# Patient Record
Sex: MALE | Race: WHITE | NOT HISPANIC OR LATINO | Employment: FULL TIME | URBAN - METROPOLITAN AREA
[De-identification: names, ages, dates, MRNs, and addresses within clinical notes are randomized per-mention and may not be internally consistent; named-entity substitution may affect disease eponyms.]

---

## 2019-11-26 ENCOUNTER — OFFICE VISIT (OUTPATIENT)
Dept: URGENT CARE | Facility: URGENT CARE | Age: 39
End: 2019-11-26
Payer: COMMERCIAL

## 2019-11-26 VITALS
OXYGEN SATURATION: 97 % | TEMPERATURE: 98.8 F | DIASTOLIC BLOOD PRESSURE: 84 MMHG | HEART RATE: 88 BPM | SYSTOLIC BLOOD PRESSURE: 129 MMHG

## 2019-11-26 DIAGNOSIS — R07.0 THROAT PAIN: Primary | ICD-10-CM

## 2019-11-26 LAB
DEPRECATED S PYO AG THROAT QL EIA: NORMAL
SPECIMEN SOURCE: NORMAL

## 2019-11-26 PROCEDURE — 87880 STREP A ASSAY W/OPTIC: CPT | Performed by: PHYSICIAN ASSISTANT

## 2019-11-26 PROCEDURE — 99203 OFFICE O/P NEW LOW 30 MIN: CPT | Performed by: PHYSICIAN ASSISTANT

## 2019-11-26 PROCEDURE — 87081 CULTURE SCREEN ONLY: CPT | Performed by: PHYSICIAN ASSISTANT

## 2019-11-26 RX ORDER — BUPROPION HYDROCHLORIDE 150 MG/1
150 TABLET ORAL
COMMUNITY
Start: 2019-07-19 | End: 2020-07-18

## 2019-11-26 ASSESSMENT — ENCOUNTER SYMPTOMS
NAUSEA: 0
SORE THROAT: 1
COUGH: 1
VOMITING: 0
DIARRHEA: 0
FEVER: 0
CHILLS: 0

## 2019-11-27 LAB
BACTERIA SPEC CULT: NORMAL
SPECIMEN SOURCE: NORMAL

## 2019-11-27 NOTE — PROGRESS NOTES
SUBJECTIVE:   Tramaine Merritt is a 39 year old male presenting with a chief complaint of   Chief Complaint   Patient presents with     Urgent Care     Pharyngitis     Possible strep started today- throat pain, white spots in throat, fatigue       He is a new patient of [facility].    Pharyngitis    Onset of symptoms was this morning  Course of illness is worsening.    Severity moderate  Current and Associated symptoms: sore throat  Treatment measures tried include None tried.  Predisposing factors include None.  Denies fever or chills.      Review of Systems   Constitutional: Negative for chills and fever.   HENT: Positive for sore throat.    Respiratory: Positive for cough (very mild).    Gastrointestinal: Negative for diarrhea, nausea and vomiting.       History reviewed. No pertinent past medical history.  History reviewed. No pertinent family history.  Current Outpatient Medications   Medication Sig Dispense Refill     buPROPion (WELLBUTRIN XL) 150 MG 24 hr tablet Take 150 mg by mouth       Social History     Tobacco Use     Smoking status: Former Smoker     Smokeless tobacco: Never Used   Substance Use Topics     Alcohol use: Not on file       OBJECTIVE  /84 (BP Location: Right arm, Patient Position: Chair, Cuff Size: Adult Large)   Pulse 88   Temp 98.8  F (37.1  C) (Oral)   SpO2 97%     Physical Exam  Vitals signs and nursing note reviewed.   Constitutional:       General: He is not in acute distress.     Appearance: He is well-developed.   HENT:      Head: Normocephalic and atraumatic.      Right Ear: Tympanic membrane and external ear normal.      Left Ear: Tympanic membrane and external ear normal.      Mouth/Throat:      Mouth: Mucous membranes are moist.      Comments: Mild posterior oropharynx inflammation  Eyes:      Conjunctiva/sclera: Conjunctivae normal.   Neck:      Musculoskeletal: Normal range of motion.   Cardiovascular:      Rate and Rhythm: Regular rhythm.      Heart sounds: Normal heart  sounds.   Pulmonary:      Effort: Pulmonary effort is normal. No respiratory distress.      Breath sounds: Normal breath sounds.   Skin:     General: Skin is warm and dry.   Neurological:      Mental Status: He is alert.         Labs:  Results for orders placed or performed in visit on 11/26/19 (from the past 24 hour(s))   Rapid strep screen   Result Value Ref Range    Specimen Description Throat     Rapid Strep A Screen       NEGATIVE: No Group A streptococcal antigen detected by immunoassay, await culture report.           ASSESSMENT:      ICD-10-CM    1. Throat pain R07.0 Rapid strep screen     Beta strep group A culture              PLAN:    Acute pharyngitis: Rapid strep is negative today.  Throat culture is pending.  Supportive care measures advised.  We will communicate any positive finding on the throat culture result.  Follow-up if any worsening symptoms.  Patient understands and agrees with the plan.      Followup:    If not improving or if condition worsens, follow up with your Primary Care Provider

## 2020-03-13 ENCOUNTER — HOSPITAL ENCOUNTER (EMERGENCY)
Facility: CLINIC | Age: 40
Discharge: HOME | End: 2020-03-13
Attending: EMERGENCY MEDICINE | Admitting: EMERGENCY MEDICINE
Payer: COMMERCIAL

## 2020-03-13 VITALS
HEART RATE: 72 BPM | DIASTOLIC BLOOD PRESSURE: 83 MMHG | RESPIRATION RATE: 16 BRPM | SYSTOLIC BLOOD PRESSURE: 132 MMHG | TEMPERATURE: 98.1 F | OXYGEN SATURATION: 95 %

## 2020-03-13 DIAGNOSIS — R42 DIZZINESS: ICD-10-CM

## 2020-03-13 LAB
ANION GAP SERPL CALCULATED.3IONS-SCNC: 4 MMOL/L (ref 3–14)
BASOPHILS # BLD AUTO: 0 10E9/L (ref 0–0.2)
BASOPHILS NFR BLD AUTO: 0.6 %
BUN SERPL-MCNC: 14 MG/DL (ref 7–30)
CALCIUM SERPL-MCNC: 9.2 MG/DL (ref 8.5–10.1)
CHLORIDE SERPL-SCNC: 105 MMOL/L (ref 94–109)
CO2 SERPL-SCNC: 29 MMOL/L (ref 20–32)
CREAT SERPL-MCNC: 0.84 MG/DL (ref 0.66–1.25)
DIFFERENTIAL METHOD BLD: NORMAL
EOSINOPHIL # BLD AUTO: 0.6 10E9/L (ref 0–0.7)
EOSINOPHIL NFR BLD AUTO: 8.5 %
ERYTHROCYTE [DISTWIDTH] IN BLOOD BY AUTOMATED COUNT: 13.7 % (ref 10–15)
GFR SERPL CREATININE-BSD FRML MDRD: >90 ML/MIN/{1.73_M2}
GLUCOSE SERPL-MCNC: 123 MG/DL (ref 70–99)
HCT VFR BLD AUTO: 44.5 % (ref 40–53)
HGB BLD-MCNC: 14.2 G/DL (ref 13.3–17.7)
IMM GRANULOCYTES # BLD: 0 10E9/L (ref 0–0.4)
IMM GRANULOCYTES NFR BLD: 0.1 %
LYMPHOCYTES # BLD AUTO: 1.7 10E9/L (ref 0.8–5.3)
LYMPHOCYTES NFR BLD AUTO: 24.6 %
MCH RBC QN AUTO: 27.8 PG (ref 26.5–33)
MCHC RBC AUTO-ENTMCNC: 31.9 G/DL (ref 31.5–36.5)
MCV RBC AUTO: 87 FL (ref 78–100)
MONOCYTES # BLD AUTO: 0.3 10E9/L (ref 0–1.3)
MONOCYTES NFR BLD AUTO: 4.8 %
NEUTROPHILS # BLD AUTO: 4.3 10E9/L (ref 1.6–8.3)
NEUTROPHILS NFR BLD AUTO: 61.4 %
NRBC # BLD AUTO: 0 10*3/UL
NRBC BLD AUTO-RTO: 0 /100
PLATELET # BLD AUTO: 247 10E9/L (ref 150–450)
POTASSIUM SERPL-SCNC: 3.9 MMOL/L (ref 3.4–5.3)
RBC # BLD AUTO: 5.1 10E12/L (ref 4.4–5.9)
SODIUM SERPL-SCNC: 138 MMOL/L (ref 133–144)
WBC # BLD AUTO: 7 10E9/L (ref 4–11)

## 2020-03-13 PROCEDURE — 99285 EMERGENCY DEPT VISIT HI MDM: CPT | Mod: 25

## 2020-03-13 PROCEDURE — 80048 BASIC METABOLIC PNL TOTAL CA: CPT | Performed by: EMERGENCY MEDICINE

## 2020-03-13 PROCEDURE — 96361 HYDRATE IV INFUSION ADD-ON: CPT

## 2020-03-13 PROCEDURE — 25000128 H RX IP 250 OP 636: Performed by: EMERGENCY MEDICINE

## 2020-03-13 PROCEDURE — 25800030 ZZH RX IP 258 OP 636: Performed by: EMERGENCY MEDICINE

## 2020-03-13 PROCEDURE — 93005 ELECTROCARDIOGRAM TRACING: CPT

## 2020-03-13 PROCEDURE — 25000132 ZZH RX MED GY IP 250 OP 250 PS 637: Performed by: EMERGENCY MEDICINE

## 2020-03-13 PROCEDURE — 96374 THER/PROPH/DIAG INJ IV PUSH: CPT

## 2020-03-13 PROCEDURE — 85025 COMPLETE CBC W/AUTO DIFF WBC: CPT | Performed by: EMERGENCY MEDICINE

## 2020-03-13 RX ORDER — ONDANSETRON 4 MG/1
4 TABLET, ORALLY DISINTEGRATING ORAL EVERY 8 HOURS PRN
Qty: 10 TABLET | Refills: 0 | Status: SHIPPED | OUTPATIENT
Start: 2020-03-13

## 2020-03-13 RX ORDER — ONDANSETRON 2 MG/ML
4 INJECTION INTRAMUSCULAR; INTRAVENOUS ONCE
Status: COMPLETED | OUTPATIENT
Start: 2020-03-13 | End: 2020-03-13

## 2020-03-13 RX ORDER — MECLIZINE HYDROCHLORIDE 25 MG/1
25 TABLET ORAL ONCE
Status: COMPLETED | OUTPATIENT
Start: 2020-03-13 | End: 2020-03-13

## 2020-03-13 RX ORDER — TETRACAINE HYDROCHLORIDE 5 MG/ML
SOLUTION OPHTHALMIC
Status: DISCONTINUED
Start: 2020-03-13 | End: 2020-03-13 | Stop reason: HOSPADM

## 2020-03-13 RX ORDER — VALACYCLOVIR HYDROCHLORIDE 1 G/1
1000 TABLET, FILM COATED ORAL 3 TIMES DAILY
Qty: 21 TABLET | Refills: 0 | Status: SHIPPED | OUTPATIENT
Start: 2020-03-13 | End: 2020-03-20

## 2020-03-13 RX ORDER — MECLIZINE HYDROCHLORIDE 25 MG/1
25 TABLET ORAL EVERY 6 HOURS PRN
Qty: 30 TABLET | Refills: 1 | Status: SHIPPED | OUTPATIENT
Start: 2020-03-13

## 2020-03-13 RX ORDER — SODIUM CHLORIDE 9 MG/ML
1000 INJECTION, SOLUTION INTRAVENOUS CONTINUOUS
Status: DISCONTINUED | OUTPATIENT
Start: 2020-03-13 | End: 2020-03-13 | Stop reason: HOSPADM

## 2020-03-13 RX ADMIN — MECLIZINE HYDROCHLORIDE 25 MG: 25 TABLET ORAL at 14:56

## 2020-03-13 RX ADMIN — ONDANSETRON 4 MG: 2 INJECTION INTRAMUSCULAR; INTRAVENOUS at 15:06

## 2020-03-13 RX ADMIN — SODIUM CHLORIDE 1000 ML: 9 INJECTION, SOLUTION INTRAVENOUS at 15:06

## 2020-03-13 ASSESSMENT — ENCOUNTER SYMPTOMS
PHOTOPHOBIA: 1
NAUSEA: 1
FEVER: 0
EYE REDNESS: 1
DIZZINESS: 1

## 2020-03-13 NOTE — ED NOTES
Pt passed ambulation trial. Reported dizziness while walking but better than upon arrival to ED. Able to ambulate independently. MD notified

## 2020-03-13 NOTE — ED PROVIDER NOTES
History     Chief Complaint:  Dizziness    HPI   Tramaine Merritt is a 39 year old male who presents to the emergency department today with dizziness. The patient reports vertigo that started last night. He woke up in the middle of the night feeling nauseous. He woke up this morning still feeling dizzy and nauseous. He drank juice but it did not help. He ate and took a nap, but continues to have symptoms, which is why he presents to the ED. He has never had this before He had shingles on the head and face about 1 month ago. Patient just came back from the [place] 1 week ago and had a few more shingles macules outbreak after that. He reports left eye blurriness, photophobia, and redness and was worried his shingles was going into the left eye and he was started on drops. This has since improved. He denies chest pain, fever.     Allergies:  Tramadol      Medications:    Wellbutrin     Past Medical History:    The patient denies any relevant past medical history.     Past Surgical History:    History reviewed. No pertinent past surgical history.    Family History:    History reviewed. No pertinent family history.     Social History:  The patient was accompanied to the ED by wife.  Smoking Status: Former  Smokeless Tobacco: Never   Marital Status:      Review of Systems   Constitutional: Negative for fever.   Eyes: Positive for photophobia (left), redness (left) and visual disturbance (left blurry).   Cardiovascular: Negative for chest pain.   Gastrointestinal: Positive for nausea.   Skin: Positive for rash (head shingles).   Neurological: Positive for dizziness.   All other systems reviewed and are negative.    Physical Exam     Patient Vitals for the past 24 hrs:   BP Temp Pulse Heart Rate Resp SpO2   03/13/20 1548 -- -- -- -- -- 95 %   03/13/20 1530 121/64 -- 71 -- -- --   03/13/20 1515 133/75 -- 76 -- -- --   03/13/20 1500 -- -- -- -- -- 93 %   03/13/20 1445 -- -- -- -- -- 93 %   03/13/20 1430  119/66 -- -- -- -- 97 %   03/13/20 1427 119/66 -- -- 80 16 97 %   03/13/20 1340 (!) 170/111 98.1  F (36.7  C) 87 87 16 98 %      Physical Exam  General: Patient is alert and interactive when I enter the room  Head:  The scalp, face, and head appear normal  Eyes:  Conjunctivae are normal, PERRL, horizontal nystagmus bilaterally R>L, no fluorecin uptake on left   ENT:    The nose is normal    Pinnae are normal    External acoustic canals are normal    TM clear bilaterally   Neck:  Trachea midline  CV:  Pulses are normal    Resp:  No respiratory distress   Abdomen:      Soft, non-tender, non-distended  Musc:  Normal muscular tone    No major joint effusions    No asymmetric leg swelling  Skin:  No rash or lesions noted  Neuro:  Speech is normal and fluent. Face is symmetric.     Moving all extremities well. No limb ataxia. No pronator drift. Cranial nerves intact.   Psych: Awake. Alert.  Normal affect.  Appropriate interactions.    Emergency Department Course     ECG:  Indication: dizziness  Completed at 1427.  Read at 1432.   Normal sinus rhythm   Minimal voltage criteria for LVH, may be normal variant   Nonspecific T wave abnormality   Abnormal ECG    Rate 75 bpm. NE interval 138. QRS duration 96. QT/QTc 368/410. P-R-T axes 39 6 23.     Laboratory:  Laboratory findings were communicated with the patient who voiced understanding of the findings.  BMP: 123 Glucose o/w WNL (Creatinine 0.84)   CBC: AWNL (WBC 7.0, HGB 14.2, )      Interventions:  1456: Antivert 25 mg PO   1506: 0.9% NaCl 1L IV Bolus   1506: Zofran 4mg IV      Emergency Department Course:  Nursing notes and vitals reviewed.  1445: I performed an exam of the patient as documented above.   IV was inserted and blood was drawn for laboratory testing, results above.  1658: Findings and plan explained to the Patient. Patient discharged home with instructions regarding supportive care, medications, and reasons to return. The importance of close follow-up  was reviewed. The patient was prescribed Antivert, Zofran, and Valtrex.    I personally reviewed the laboratory results with the Patient and answered all related questions prior to discharge.      Impression & Plan    Medical Decision Making:  Tramaine Merritt is a 39 year old male who presents for evaluation of vertigo. The differential diagnosis of vertigo is broad and includes common etiologies such as menieres disease, labyrinthitis, benign positional vertigo, otitis media, etc.  More serious etiologies considered include central etiologies such as tumor, intracerebral bleed, dissection, ischemic cerebral vascular accident.  The history, physical exam including detailed neurologic exam, and workup in the emergency room suggests a benign cause of vertigo today.  Patient feels improved after interventions noted above. Further outpatient management is indicated with vertigo medications.       No indication for advanced imaging at this point (CT/MRI) as there are no definite signs of a central and concerning etiology for the vertigo.      Vertigo precautions given for home.       He also had concern about shingles involvement of his eye.  He has no conjunctival injection and no fluorescein uptake on exam.  He did have erythema of his conjunctiva previously per patient but is since resolved so he could have had a mild conjunctivitis.  If he continues to have ocular symptoms he needs to have an eye exam however I do not see any signs of herpetic eye involvement at this time.    Diagnosis:    ICD-10-CM    1. Dizziness  R42        Disposition:  discharged to home    Discharge Medications:  New Prescriptions    MECLIZINE (ANTIVERT) 25 MG TABLET    Take 1 tablet (25 mg) by mouth every 6 hours as needed for dizziness    ONDANSETRON (ZOFRAN ODT) 4 MG ODT TAB    Take 1 tablet (4 mg) by mouth every 8 hours as needed for nausea    VALACYCLOVIR (VALTREX) 1000 MG TABLET    Take 1 tablet (1,000 mg) by mouth 3 times daily for 7 days      Scribe Disclosure:  I, Yuridia Fletcher MD, am serving as a scribe at 2:47 PM on 3/13/2020 to document services personally performed by Sanjuana Rizvi MD based on my observations and the provider's statements to me.    3/13/2020   [facility] EMERGENCY DEPARTMENT       Sanjuana Rizvi MD  03/14/20 1122       Sanjuana Rizvi MD  03/14/20 1122

## 2020-03-13 NOTE — ED AVS SNAPSHOT
[facility] Emergency Department  [address]  [facility] [address]  Phone:  [phone]  Fax:  [phone]                                    Tramaine Merritt   MRN: [id number]    Department:  [facility] Emergency Department   Date of Visit:  3/13/2020           After Visit Summary Signature Page    I have received my discharge instructions, and my questions have been answered. I have discussed any challenges I see with this plan with the nurse or doctor.    ..........................................................................................................................................  Patient/Patient Representative Signature      ..........................................................................................................................................  Patient Representative Print Name and Relationship to Patient    ..................................................               ................................................  Date                                   Time    ..........................................................................................................................................  Reviewed by Signature/Title    ...................................................              ..............................................  Date                                               Time          22EPIC Rev 08/18

## 2020-03-13 NOTE — ED TRIAGE NOTES
"Pt c/o sudden dizziness about 2300 last pm while laying on the couch. Pt states feels like the world is spinning an states \"Ifeel really drunk\".   Pt has tx for singles at the begining of February.  "

## 2020-03-14 LAB — INTERPRETATION ECG - MUSE: NORMAL

## 2021-02-08 ENCOUNTER — OFFICE VISIT - HEALTHEAST (OUTPATIENT)
Dept: CARDIOLOGY | Facility: CLINIC | Age: 41
End: 2021-02-08

## 2021-02-08 DIAGNOSIS — Z00.6 EXAMINATION OF PARTICIPANT OR CONTROL IN CLINICAL RESEARCH: ICD-10-CM

## 2021-02-11 ENCOUNTER — AMBULATORY - HEALTHEAST (OUTPATIENT)
Dept: CARDIOLOGY | Facility: CLINIC | Age: 41
End: 2021-02-11

## 2021-02-11 DIAGNOSIS — Z00.6 EXAMINATION OF PARTICIPANT OR CONTROL IN CLINICAL RESEARCH: ICD-10-CM

## 2021-02-11 ASSESSMENT — MIFFLIN-ST. JEOR: SCORE: 2098.34

## 2021-03-04 ENCOUNTER — AMBULATORY - HEALTHEAST (OUTPATIENT)
Dept: CARDIOLOGY | Facility: CLINIC | Age: 41
End: 2021-03-04

## 2021-03-04 DIAGNOSIS — Z00.6 EXAMINATION OF PARTICIPANT IN CLINICAL TRIAL: ICD-10-CM

## 2021-03-18 ENCOUNTER — AMBULATORY - HEALTHEAST (OUTPATIENT)
Dept: CARDIOLOGY | Facility: CLINIC | Age: 41
End: 2021-03-18

## 2021-03-18 DIAGNOSIS — Z00.6 EXAMINATION OF PARTICIPANT IN CLINICAL TRIAL: ICD-10-CM

## 2021-05-27 VITALS
RESPIRATION RATE: 18 BRPM | HEART RATE: 64 BPM | OXYGEN SATURATION: 97 % | TEMPERATURE: 98.8 F | SYSTOLIC BLOOD PRESSURE: 121 MMHG | DIASTOLIC BLOOD PRESSURE: 73 MMHG

## 2021-05-27 VITALS
TEMPERATURE: 98.2 F | RESPIRATION RATE: 18 BRPM | DIASTOLIC BLOOD PRESSURE: 82 MMHG | OXYGEN SATURATION: 98 % | SYSTOLIC BLOOD PRESSURE: 128 MMHG | HEART RATE: 78 BPM

## 2021-05-28 ENCOUNTER — RECORDS - HEALTHEAST (OUTPATIENT)
Dept: ADMINISTRATIVE | Facility: CLINIC | Age: 41
End: 2021-05-28

## 2021-06-05 VITALS
HEART RATE: 70 BPM | DIASTOLIC BLOOD PRESSURE: 85 MMHG | RESPIRATION RATE: 18 BRPM | SYSTOLIC BLOOD PRESSURE: 142 MMHG | OXYGEN SATURATION: 97 % | TEMPERATURE: 98.1 F | HEIGHT: 67 IN | WEIGHT: 270 LBS | BODY MASS INDEX: 42.38 KG/M2

## 2021-06-18 NOTE — PATIENT INSTRUCTIONS - HE
Patient Instructions by Orquidea Mckeon RN at 3/4/2021  3:00 PM     Author: Orquidea Mckeon RN Service: -- Author Type: Registered Nurse    Filed: 3/4/2021  3:06 PM Encounter Date: 3/4/2021 Status: Signed    : Orquidea Mckeon RN (Registered Nurse)           PREVENT-19 Day 21  Thank you for coming in today for your 2nd injection.  Please continue to add your temperature daily in the tutu.  If you have symptoms, please wait to start the nasal swab collections until you have been contacted by the study team and instructed to do so.    Your next visit is Day 35. We have scheduled this for 3/18/21 at 3 PM  If you have questions, you may call the [facility] department at [phone]    Best regards,  Orquidea Mckeon RN

## 2021-06-18 NOTE — PATIENT INSTRUCTIONS - HE
Patient Instructions by Orquidea Mckeon RN at 3/18/2021  3:00 PM     Author: Orquidea Mckeon RN Service: -- Author Type: Registered Nurse    Filed: 3/18/2021  2:58 PM Encounter Date: 3/18/2021 Status: Signed    : Orquidea Mckeon RN (Registered Nurse)           Thank you for coming in today for the Day 35 visit.  Your next study visit is Month 3.   Reminder: this visit will be at the [place] [place] [facility] ([facility]).  The address for the [facility] is: [address] [place] [address]  The phone number is [phone]  We have provided you with a map for your reference too.    The study team from the [facility] will be in contact with you to schedule your next study visit.     The PREVENT-19 team at [facility] wishes you all the best and appreciates the opportunity to partner with you in this vaccine trial.  We wish you all the best in the future.    Orquidea Mckeon RN

## 2021-06-18 NOTE — PATIENT INSTRUCTIONS - HE
Patient Instructions by Orquidea Mckeon RN at 2/11/2021  8:00 AM     Author: Orquidea Mckeon RN Service: -- Author Type: Registered Nurse    Filed: 2/11/2021  8:16 AM Encounter Date: 2/11/2021 Status: Signed    : Orquidea Mckeon RN (Registered Nurse)         Thank you for coming in today and agreeing to participate in the PREVENT-19 COVID-19 Vaccine Clinical Trial     Your next appointment is in 21 days.   You are going home with the following items    A ruler to measure any injection site reaction that might happen. This includes areas of redness, swelling, or warmth    A kit for collection of nasal swabs for you to use as directed  o Instructions on how to collect the nasal swab    Participant Guidance Document    Folder with your copy of the consent form with copies of the signature pages    A thermometer and instructions for taking your temperature    [employer] (payment debit card): As stated within the consent form, you will receive these funds within 30 days of your visit.  While we will work to have the funds available as soon as possible, you should expect it will take a minimum of 2 weeks.  If you do not have funds loaded by your Day 21 visit, please let us know.  If you have issues with the card, please call us at [phone].      Orquidea Mckeon RN

## 2021-06-30 NOTE — PROGRESS NOTES
Progress Notes by Orquidea Mckeon RN at 3/4/2021  3:00 PM     Author: Orquidea Mckeon RN Service: -- Author Type: Registered Nurse    Filed: 3/5/2021  3:32 PM Encounter Date: 3/4/2021 Status: Signed    : Masha Estevez MD (Physician)    Related Notes: Original Note by Orquidea Mckeon RN (Registered Nurse) filed at 3/5/2021  3:31 PM           Study name: PREVENT-19    Diagnosis/event description (diagnosis preferred):  Injection Site Reaction    Right arm pain radiating into posterior shoulder     AESI?  [] Yes   [x] No    Start date: 2/12/21  Date study team was aware of event 3/4/2021  When did the adverse event start relative to the most recent vaccination? [] Before [x] After    Date resolved (end date): 2/21/2021    Severity: ([] mild /[x]  moderate / [] severe)    Outcome: [x] Recovered/resolved [] Recovered/resolved [with sequelae]  []  recovering /resolving []  not recovered /not resolved []  Fatal [] unknown   If resolved with sequelae-specify:     Date study team aware of event: 3/4/2021    Was treatment given for this event:  [x] Yes   [] No   If yes, provide details: Patient went to chiropractor 3 times during a 7 day period.    *Ibuprofen 200 mg tablets PO Dose: 600 mg taken two times a day for 7 days.  Start Date: 2/12/2021-2/19/2021     ?  tiZANidine (ZANAFLEX) 2 MG tablet, Take 2 mg by mouth 3 (three) times a day as needed. Start date: 2/17/2021 Indication: Musculoskeletal pain    Serious adverse event?   [] Yes   [x] No    [] Yes   [x] No  Endpoint?  [] Yes   [x] No   Did the adverse event cause the subject to be discontinued from the study? [] Yes   [x] No  What action was taken with study vaccine due to adverse event following first dose?   [x] Vaccine Schedule Not Changed  [] Next Vaccine Delayed  [] 2nd Vaccine Not Administered  [] Not Applicable  What other action was taken in response to the adverse event? NA    Is this AE suspected, probable or confirmed to be related to  "COVID-19?   [facility] (potential immune-mediated medical condition)? [] Yes   [x] No  [facility] (medically attended adverse event) [] Yes   [x] No  Is the event continuing from the Diary Card (solicited local and/or systemic reactogenicity event)? [] Yes   [x] No    Dr. Estevez: Reasonable possibility that AE is related to study treatment   [x] Yes   [] No    Is this event related to study vaccine? [] Not Related [x] Related  Is this event related to study procedure?[x]  Not Related [] Related    Was the adverse event serious? [] Yes   [x] No  Date of Seriousness Onset (if different from AE Start Date) __________________________________  Did the adverse event result in death? [] Yes   [x] No  Is the adverse event Life Threatening? [] Yes   [x] No  Did the adverse event result in initial or prolonged hospitalization for the subject? [] Yes   [x] No  Is the adverse event associated with a congenital anomaly or birth defect? [] Yes   [x] No  Is the adverse event a medically important event not covered by other \"serious\" criteria? [] Yes   [x] No  Did the adverse event result in persistent or significant disability or incapacity? [] Yes   [x] No           "

## 2021-06-30 NOTE — PROGRESS NOTES
"Progress Notes by Orquidea Mckeon RN at 2/11/2021  8:00 AM     Author: Orquidea Mckeon RN Service: -- Author Type: Registered Nurse    Filed: 3/1/2021 11:07 AM Encounter Date: 2/11/2021 Status: Addendum    : Orquidea Mckeon RN (Registered Nurse)    Related Notes: Original Note by Orquidea Mckeon RN (Registered Nurse) filed at 2/17/2021 10:27 AM           Visits Screening/Baseline (1)    Time seated: 0755    The study discussion continued with an introduction of the study purpose and the qualifications for participation.     The consent discussion included the following:    Description of trial    Number of Participants    Risks and Discomforts    Unforeseeable Risks    Benefits    Alternative Procedures or Treatments    Confidentiality    Compensation and Medical Treatments in Event of Injury    Contacts    Voluntary Participation    Involuntary Termination of Participant's Participation    Additional Costs to Participant    Consequences of Participant's Decision to Withdraw    Providing Significant New Findings to Participants      Tramaine Merritt was provided time to consider participation and ask questions.  All questions answered to his satisfaction.  Tramaine Merritt was queried regarding their understanding of the trial. he was able to correctly answer the following questions:    Double blind placebo control    Follow up visits    Need to report side effects and/or possible COVID-19 symptoms       Tramaine Merritt has agreed to participate in the \"PREVENT-19\" COVID-19 vaccine clinical trial.  Consent form signed (version 3, IRB approved Nov 25, 2020), copies of consent signatures provided to participant.  No study procedures performed prior to obtaining consent to participate.       In person, scheduled  In the last 2 weeks, did the participant attend any large gatherings (> 10 people), or come in close contact (<6 feet) with a person known to  have COVID-19, returned to school or to work " "in-person?  [] Yes   [x] No    Reviewed Inclusion/Exclusion criteria--please refer to that note for details and for co-sign by Dr. Estevez/CLARE.    1980   male  Ethnicity   []  or    [x] Not  or   Race   []  or    []    [] Black or   []  or other    [x] White    Occupation   Attending school in person   [] Yes   [x] No   Currently working    [x] Yes   [] No    If yes: Required to be in close proximity (<6 ft) to other people?   [] Yes   [x] No     -[facility] 5 days/week       Living Situation  How many people live with the subject (other than subject)? 3  Total people under 18 years of age 2  Total people between 18-64 years of age 1  Total people 65 years of age or older 0        Lifestyle  Does the subject have a history of smoking or vaping? [x] Yes   [] No  Is the subject currently smoking or vaping?   [] Yes   [x] No    Randomization  Date of randomization: 2/11/21  Randomization number: [id number]  Age group  [x] 18-64 years  [] > 65 years    Physical Exam   Please refer to PA note for PE details  VS-T,P, R, BP, wt, ht  Visit Vitals  /85 (Patient Site: Left Arm, Patient Position: Sitting, Cuff Size: Adult Large)   Pulse 70   Temp 98.1  F (36.7  C) (Oral)   Resp 18   Ht 5' 7\" (1.702 m)   Wt (!) 270 lb (122.5 kg)   SpO2 97%   BMI 42.29 kg/m      Vitals:    02/11/21 0816   Weight: (!) 270 lb (122.5 kg)      Height: 5'7\"        Study labs (Immunogenicity, SARS-CoV-2 (anti-NP))  drawn   [x] Yes   Time 0900 [] No, why?     Nasal swab collection:  [x] Yes   Time 0915 [] No, why?   Requisition number: [id number]    Administrations This Visit     Study SARS-CoV-2 vaccine vs. placebo (IDS# [id number]) injection injection 0.5 mL     Admin Date  02/11/2021 0935 Action  Given Dose  0.5 mL Route  Intramuscular Administered By  Orquidea Mckeon, RN                    Participant waited in Clinical Research Unit (CRU) for @ least " 30 minutes after receiving injection.  Participant experienced no adverse symptoms prior to leaving CRU  Study team reviewed completion instructions with participant  Participant provided with eDiary to record all subsequent AEs and COVID-19 symptomatology.  Participant provided with nasal swab home collection kit and instructions on completion   Participant provided with thermometer and reviewed instructions on how to take oral temperature.  Participant provided with a ruler to measure any site reactions that occur; instructed on how to use.  Participant provided with After Visit Summary that includes these instructions and next appointments.  he confirms that all questions have been answered to his satisfaction.    Plan: Next study visit (Visit 2) scheduled     Discharge time 1005    Orquidea Mckeon RN    Current Outpatient Medications   Medication Sig   ?     ? buPROPion (WELLBUTRIN XL) 150 MG 24 hr tablet Take 150 mg by mouth daily. Indication: Depression Start Date: 7/19/2019   ? ibuprofen (ADVIL,MOTRIN) 400 MG tablet Take 400 mg by mouth every 6 (six) hours as needed for pain. Indication: Back Pain  Start Date: 2008   ?         Past Medical History:   Diagnosis Date   ? Back pain 2008   ? Depression 2018   ? Sleep apnea 2015    CPAP

## 2021-06-30 NOTE — PROGRESS NOTES
Progress Notes by Francia Appiah PA-C at 3/4/2021  3:00 PM     Author: Francia Appiah PA-C Service: -- Author Type: Physician Assistant    Filed: 3/4/2021  3:37 PM Encounter Date: 3/4/2021 Status: Signed    : Francia Appiah PA-C (Physician Assistant)             What was the body system examined: Defaulted value based on protocol requirements and will not require .   System  Normal/Abnormal  If abnormal, CS?   If abnormal, describe    Skin  Normal  Not applicable     HEENT Normal  Not applicable     Neck  Normal  Not applicable     Thyroid  Normal  Not applicable      Lungs  Normal  Not applicable     Heart  Normal  Not applicable     Abdomen  Normal  Not applicable     Lymph Nodes  Normal  Not applicable     Musculoskeletal/Extremities  Normal  Not applicable     Other  Normal  Not applicable        What was the overall interpretation: Please select from the dropdown list.  Normal    OLGA Urena PA-C

## 2021-06-30 NOTE — PROGRESS NOTES
"Progress Notes by Orquidea Mckeon RN at 2/11/2021  8:00 AM     Author: Orquidea Mckeon RN Service: -- Author Type: Registered Nurse    Filed: 2/11/2021  3:31 PM Encounter Date: 2/11/2021 Status: Signed    : Masha Estevez MD (Physician)    Related Notes: Original Note by Orquidea Mckeon RN (Registered Nurse) filed at 2/11/2021  2:26 PM           Study Name: PREVENT-19  : Kelley Hua MD   Sub Investigator: Chester Estevez MD    Protocol version: 3.0, 16 NOV 2020    Inclusion Criteria  Criteria #   Inclusion Criteria (all must be yes)    1  Adults ? 18 years of age at screening who, by virtue of age, race, ethnicity or life circumstances, are considered at substantial risk of exposure to and infection with SARS-CoV-2   Yes   2  Willing and able to give informed consent prior to study enrollment and to comply with study procedu   Yes   3  Participants of childbearing potential (defined as any participant who has experienced menarche and who is NOT surgically sterile [ie, hysterectomy, bilateral tubal ligation, or bilateral oophorectomy] or postmenopausal [defined as amenorrhea at least 12 consecutive months]) must agree to be heterosexually inactive from at least 28 days prior to enrollment and through 3 months after the last vaccination OR agree to consistently use a medically acceptable method of contraception from at least 28 days prior to enrollment and through 3 months after the last vaccination   Yes   4  Is medically stable, as determined by the investigator (based on review of health status, vital signs (TPRBP) medical history, & targeted physical examination (weight)  VS must be within medically acceptable ranges prior to the first vaccination.   Yes   5  Agree to not participate in any other SARS-CoV-2 prevention trial during the study follow-up.   Yes     Exclusion Criteria  Criteria #  -all must be \"no\"    1  Unstable acute or chronic illness. Criteria for unstable " medical conditions include   a. Substantive changes in chronic prescribed medication (change in class or significant change in dose) in the past 2 months  b. Currently undergoing workup of undiagnosed illness that could lead to diagnosis of a new condition   Note: Well-controlled human immunodeficiency virus [HIV] with undetectable HIV RNA and CD4 count > 200 cells/?L for at least 1 year, documented within the last 6 months, is NOT considered an unstable chronic illness.    No   2  Participation in research involving an investigational product (drug/biologic/device) within 45 days prior to first study vaccination   No   3  History of a previous laboratory-confirmed diagnosis of SARS-CoV-2 infection or COVID-19   No   4  Received influenza vaccination or any other adult vaccine within 4 days prior to or within 7 days after either study vaccination   No   5  Autoimmune or immunodeficiency disease/condition (iatrogenic or congenital) requiring ongoing immunomodulatory therapy NOTE: Stable endocrine disorders (eg, thyroiditis, pancreatitis), including stable diabetes mellitus with no history of diabetic ketoacidosis) are NOT excluded   No   6  Chronic administration (defined as > 14 continuous days) of immunosuppressant, systemic glucocorticoids, or other immune-modifying drugs within 90 days prior to first study vaccination. NOTE: An immunosuppressant dose of glucocorticoid is defined as a systemic dose ? 20 mg of prednisone per day or equivalent. The use of topical, inhaled, and nasal glucocorticoids is permitted. Topical tacrolimus and ocular cyclosporin are permitted   No   7  Received immunoglobulin, blood-derived products, or immunosuppressant drugs within 90 days prior to first study vaccination   No   8  Active cancer (malignancy) on therapy within 1 year prior to first study vaccination (with the exception of malignancy cured via excision, at the discretion of the investigator)   No   9  Any known allergies to  products contained in the investigational product.   No   10  Participants who are breastfeeding, pregnant or who plan to become pregnant within 3 months following last study vaccination   No   11  Any other condition that, in the opinion of the investigator, would pose a health risk to the participant if enrolled or could interfere with evaluation of the trial vaccine or interpretation of study results.   No   12  Study team member or first-degree relative of any study team member (inclusive of Sponsor, and study site personnel involved in the study)   No   13  Current participation in any other COVID-19 prevention clinical trial.   No       Subject has met all inclusion criteria and no exclusion criteria have been met.   Subject is ready to fully enrolled in the PREVENT-19 study.    Orquidea Mckeon RN

## 2021-06-30 NOTE — PROGRESS NOTES
Progress Notes by Francia Appiah PA-C at 3/18/2021  3:00 PM     Author: Francia Appiah PA-C Service: -- Author Type: Physician Assistant    Filed: 3/18/2021  3:20 PM Encounter Date: 3/18/2021 Status: Signed    : Francia Appiah PA-C (Physician Assistant)             What was the body system examined: Defaulted value based on protocol requirements and will not require .   System  Normal/Abnormal  If abnormal, CS?   If abnormal, describe    Skin  Normal  Not applicable     HEENT Normal  Not applicable     Neck  Normal  Not applicable     Thyroid  Normal  Not applicable      Lungs  Normal  Not applicable     Heart  Normal  Not applicable     Abdomen  Normal  Not applicable     Lymph Nodes  Normal  Not applicable     Musculoskeletal/Extremities  Normal  Not applicable     Other  Normal  Not applicable        What was the overall interpretation: Please select from the dropdown list.  Normal    OLGA Urena PA-C

## 2021-06-30 NOTE — PROGRESS NOTES
Progress Notes by Orquidea Mckeon RN at 3/18/2021  3:00 PM     Author: Orquidea Mckeon RN Service: -- Author Type: Registered Nurse    Filed: 3/18/2021  3:21 PM Encounter Date: 3/18/2021 Status: Signed    : Orquidea Mckeon RN (Registered Nurse)           Visit 3  3/18/21  In person, scheduled  In the last 2 weeks, did the participant attend any large gatherings (> 10 people), or come in close contact (<6 feet) with a person known to  have COVID-19, returned to school or to work in-person?  [] Yes   [x] No    Ongoing consent process: review schedule of events for today, and upcoming appointments; provided him with an update on known overall study progress; reviewed eDiary expectations; reiterated expected AEs (especially associated with injection site).  he has no further questions or concerns at this time.     Adverse Event/Serious Adverse Event: asked participant if any AE/SAEs occurred since last contact (3/4/21date of last contact)   he denies AE/SAEs       Physical Exam   Please refer to PA note for PE details  VS-T,P, R, BP, wt, ht  Visit Vitals  /73 (Patient Site: Right Arm, Patient Position: Sitting, Cuff Size: Adult Large)   Pulse 64   Temp 98.8  F (37.1  C) (Oral)   Resp 18   SpO2 97%           Study labs ([facility], Immunogenicity, SARS-CoV-2 (anti-NP))  drawn   [x] Yes   Time 1511 [] No, why?   Requisition number: [id number]      Reviewed eDiary with participant.  Study team reviewed completion instructions with participant and questions answered to his satisfaction.      Plan: Next study visit (Visit 4) scheduled Visit date not found   NOTE: remind participant that this next visit will occur @ [facility] (noted on AVS)      Orquidea Mckeon RN

## 2021-06-30 NOTE — PROGRESS NOTES
Progress Notes by Orquidea Mckeon RN at 3/4/2021  3:00 PM     Author: Orquidea Mckeon RN Service: -- Author Type: Registered Nurse    Filed: 3/5/2021  3:32 PM Encounter Date: 3/4/2021 Status: Signed    : Masha Estevez MD (Physician)    Related Notes: Original Note by Orquidea Mckeon RN (Registered Nurse) filed at 3/5/2021  3:31 PM           Study name: PREVENT-19    Diagnosis/event description (diagnosis preferred):  Injection Site Reaction    Per patient, golf-ball sized swelling at injection site.      AESI?  [] Yes   [x] No    Start date: 2/18/2021   Date study team was aware of event 3/4/2021  When did the adverse event start relative to the most recent vaccination? [] Before [x] After    Date resolved (end date): 2/25/2021     Severity: ([x] mild /[]  moderate / [] severe)    Outcome: [x] Recovered/resolved [] Recovered/resolved [with sequelae]  []  recovering /resolving []  not recovered /not resolved []  Fatal [] unknown   If resolved with sequelae-specify:   Date study team aware of event: 3/4/2021    Was treatment given for this event:  [] Yes   [x] No   If yes, provide details  Serious adverse event?   [] Yes   [x] No    [] Yes   [x] No  Endpoint?  [] Yes   [x] No   Did the adverse event cause the subject to be discontinued from the study? [] Yes   [x] No  What action was taken with study vaccine due to adverse event following first dose?   [x] Vaccine Schedule Not Changed  [] Next Vaccine Delayed  [] 2nd Vaccine Not Administered  [] Not Applicable  What other action was taken in response to the adverse event? N/A  Is this AE suspected, probable or confirmed to be related to COVID-19?   [facility] (potential immune-mediated medical condition)? [] Yes   [x] No  [facility] (medically attended adverse event) [] Yes   [x] No  Is the event continuing from the Diary Card (solicited local and/or systemic reactogenicity event)? [] Yes   [x] No    Dr. Estevez: Reasonable possibility that AE is related to  "study treatment   [x] Yes   [] No    Is this event related to study vaccine? [] Not Related [x] Related  Is this event related to study procedure?[x]  Not Related [] Related    Was the adverse event serious? [] Yes   [x] No  Date of Seriousness Onset (if different from AE Start Date) __________________________________  Did the adverse event result in death? [] Yes   [x] No  Is the adverse event Life Threatening? [] Yes   [x] No  Did the adverse event result in initial or prolonged hospitalization for the subject? [] Yes   [x] No  Is the adverse event associated with a congenital anomaly or birth defect? [] Yes   [x] No  Is the adverse event a medically important event not covered by other \"serious\" criteria? [] Yes   [x] No  Did the adverse event result in persistent or significant disability or incapacity? [] Yes   [x] No           "

## 2021-06-30 NOTE — PROGRESS NOTES
Progress Notes by Orquidea Mckeon RN at 3/4/2021  3:00 PM     Author: Orquidea Mckeon RN Service: -- Author Type: Registered Nurse    Filed: 3/5/2021  3:31 PM Encounter Date: 3/4/2021 Status: Signed    : Orquidea Mckeon RN (Registered Nurse)           Visit 2  3/4/21  In person, scheduled  Time seated: 1500  In the last 2 weeks, did the participant attend any large gatherings (> 10 people), or come in close contact (<6 feet) with a person known to  have COVID-19, returned to school or to work in-person?  [] Yes   [x] No    Reviewed Inclusion/Exclusion criteria--please refer to that note for details and for co-sign by Dr. Estevez.   Participants meeting the following criterion may be delayed for subsequent vaccination:     Respiratory symptoms in the past 3 days (ie, body temperature of > 38.0 C, cough, sore throat, difficulty breathing).   o Participant may be vaccinated when all symptoms have been resolved for > 3 days.   o Out of window vaccination is allowed for this reason.     Ongoing consent process: review schedule of events for today, and upcoming appointments; provided him with an update on known overall study progress; reviewed eDiary expectations; reiterated expected AEs (especially associated with injection site).  he has no further questions or concerns at this time.     Adverse Event/Serious Adverse Event: asked participant if any AE/SAEs occurred since last contact (2/11/21 date of last contact)     he reports AESAE:     Add AE note for full details and Dr. Estevez's review of causality           Physical Exam   Please refer to PA note for PE details  VS-T,P, R, BP  Visit Vitals  /82 (Patient Site: Right Arm, Patient Position: Sitting, Cuff Size: Adult Large)   Pulse 78   Temp 98.2  F (36.8  C) (Oral)   Resp 18   SpO2 98%         Study labs SARS-CoV-2 vaccine immunogenicity (IgG antibody to SARS-CoV-2 S protein, MN, hACE2 inhibition) drawn   [x] Yes   Time 1540 [] No, why?    Requisition number: [id number]     Administrations This Visit     Study SARS-CoV-2 vaccine vs. placebo (IDS# [id number]) injection injection 0.5 mL     Admin Date  03/04/2021 1642 Action  Given Dose  0.5 mL Route  Intramuscular Administered By  Orquidea Mckeon RN                  Reviewed eDiary with participant.  Study team reviewed completion instructions with participant and questions answered to his satisfaction.  Participant waited in Clinical Research Unit ([facility]U) for @ least 30 minutes after receiving injection.  Participant experienced no adverse symptoms prior to leaving [facility]  Discharge time: 1332    Plan: Next study visit (Visit 3) scheduled       Orquidea Mckeon RN

## 2021-06-30 NOTE — PROGRESS NOTES
Progress Notes by Francia Appiah PA-C at 2/11/2021  8:00 AM     Author: Francia Appiah PA-C Service: -- Author Type: Physician Assistant    Filed: 2/11/2021  9:01 AM Encounter Date: 2/11/2021 Status: Signed    : Francia Appiah PA-C (Physician Assistant)             What was the body system examined: Defaulted value based on protocol requirements and will not require .   System  Normal/Abnormal  If abnormal, CS?   If abnormal, describe    Skin  Normal  Not applicable     HEENT Normal  Not applicable     Neck  Normal  Not applicable     Thyroid  Normal  Not applicable      Lungs  Normal  Not applicable     Heart  Normal  Not applicable     Abdomen  Normal  Not applicable     Lymph Nodes  Normal  Not applicable     Musculoskeletal/Extremities  Normal  Not applicable     Other  Normal  Not applicable        What was the overall interpretation: Please select from the dropdown list.  Normal    OLGA Urena PA-C

## 2021-06-30 NOTE — PROGRESS NOTES
"Progress Notes by Sharri Singleton RN at 2/8/2021  3:00 PM     Author: Sharri Singleton RN Service: -- Author Type: Registered Nurse    Filed: 2/8/2021  3:58 PM Encounter Date: 2/8/2021 Status: Signed    : Sharri Singleton RN (Registered Nurse)     Summary: novavax vaccine study          Study name PREVENT-19  Protocol Title:  A Phase 3, Randomized, Observer-Blinded, Placebo-Controlled Study to Evaluate the Efficacy, Safety, and Immunogenicity of a SARS-CoV-2 Recombinant Oscar Protein Nanoparticle Vaccine (SARS-CoV-2 rS) with Matrix-M1? Adjuvant in Adult Participants ? 18 Years     Research Sharri Singleton RN nurse associate spoke with Tramaine Merritt and/or their family by phone to discuss participation in the Novavax study.    The study discussion continued with an introduction of the study purpose and the qualifications for participation.     The consent discussion included the following:    Description of trial    Number of Participants    Risks and Discomforts    Unforeseeable Risks    Benefits    Alternative Procedures or Treatments    Confidentiality    Compensation and Medical Treatments in Event of Injury    Contacts    Voluntary Participation    Involuntary Termination of Participant's Participation    Additional Costs to Participant    Consequences of Participant's Decision to Withdraw    Providing Significant New Findings to Participants      Tramaine M Shaina was provided time to consider participation and ask questions.  All questions answered to his satisfaction.  Tramaine AGUAYO Shaina was queried regarding their understanding of the trial. he was able to correctly answer the following questions:    Double blind placebo control    Follow up visits    Need to report side effects and/or possible COVID-19 symptoms       Tramaineolu Merritt has preliminarily agreed to participate in the \"PREVENT-19\" COVID-19 vaccine clinical trial.  he will sign consent form in person on 02-2- after in person continuation of consent " process. This visit is scheduled for 0800.    Sharri Singleton RN    A review of medical history and medications was done to screen for items that may impact ability to participate in the trial.     Most recent Influenza vaccine:     Must be >4 days prior to Novavax vaccine  Any other vaccines (name & dates):  None   Must be >7 days prior to Novavax vaccine    Medical History  Need: Start date (year @ minimum), end date (year @ minimum), or ongoing  Past Medical History:   Diagnosis Date   ? Back pain        Hospitalized in last 6 months? [] Yes   [x] No    Sharri Singleton RN